# Patient Record
Sex: FEMALE | Race: WHITE | Employment: OTHER | URBAN - METROPOLITAN AREA
[De-identification: names, ages, dates, MRNs, and addresses within clinical notes are randomized per-mention and may not be internally consistent; named-entity substitution may affect disease eponyms.]

---

## 2017-09-19 ENCOUNTER — APPOINTMENT (OUTPATIENT)
Dept: INTERVENTIONAL RADIOLOGY/VASCULAR | Facility: HOSPITAL | Age: 82
DRG: 253 | End: 2017-09-19
Attending: SURGERY
Payer: COMMERCIAL

## 2017-09-19 ENCOUNTER — HOSPITAL ENCOUNTER (INPATIENT)
Facility: HOSPITAL | Age: 82
LOS: 4 days | Discharge: ANOTHER HOSPITAL | DRG: 253 | End: 2017-09-23
Attending: HOSPITALIST | Admitting: HOSPITALIST
Payer: COMMERCIAL

## 2017-09-19 PROBLEM — I70.234 ATHEROSCLEROSIS OF NATIVE ARTERY OF RIGHT LOWER EXTREMITY WITH ULCERATION OF MIDFOOT (HCC): Status: ACTIVE | Noted: 2017-09-19

## 2017-09-19 PROBLEM — I99.8 ISCHEMIC FOOT: Status: ACTIVE | Noted: 2017-09-19

## 2017-09-19 PROCEDURE — 99153 MOD SED SAME PHYS/QHP EA: CPT

## 2017-09-19 PROCEDURE — 80053 COMPREHEN METABOLIC PANEL: CPT | Performed by: HOSPITALIST

## 2017-09-19 PROCEDURE — 37211 THROMBOLYTIC ART THERAPY: CPT

## 2017-09-19 PROCEDURE — 85025 COMPLETE CBC W/AUTO DIFF WBC: CPT | Performed by: HOSPITALIST

## 2017-09-19 PROCEDURE — 85610 PROTHROMBIN TIME: CPT | Performed by: HOSPITALIST

## 2017-09-19 PROCEDURE — B41F1ZZ FLUOROSCOPY OF RIGHT LOWER EXTREMITY ARTERIES USING LOW OSMOLAR CONTRAST: ICD-10-PCS | Performed by: RADIOLOGY

## 2017-09-19 PROCEDURE — 36247 INS CATH ABD/L-EXT ART 3RD: CPT

## 2017-09-19 PROCEDURE — 86850 RBC ANTIBODY SCREEN: CPT | Performed by: RADIOLOGY

## 2017-09-19 PROCEDURE — 75710 ARTERY X-RAYS ARM/LEG: CPT

## 2017-09-19 PROCEDURE — 86900 BLOOD TYPING SEROLOGIC ABO: CPT | Performed by: RADIOLOGY

## 2017-09-19 PROCEDURE — 3E05317 INTRODUCTION OF OTHER THROMBOLYTIC INTO PERIPHERAL ARTERY, PERCUTANEOUS APPROACH: ICD-10-PCS | Performed by: RADIOLOGY

## 2017-09-19 PROCEDURE — 86901 BLOOD TYPING SEROLOGIC RH(D): CPT | Performed by: RADIOLOGY

## 2017-09-19 PROCEDURE — 99152 MOD SED SAME PHYS/QHP 5/>YRS: CPT

## 2017-09-19 PROCEDURE — 86920 COMPATIBILITY TEST SPIN: CPT

## 2017-09-19 RX ORDER — MORPHINE SULFATE 2 MG/ML
2 INJECTION, SOLUTION INTRAMUSCULAR; INTRAVENOUS EVERY 2 HOUR PRN
Status: DISCONTINUED | OUTPATIENT
Start: 2017-09-19 | End: 2017-09-23

## 2017-09-19 RX ORDER — ACETAMINOPHEN 160 MG
2000 TABLET,DISINTEGRATING ORAL DAILY
COMMUNITY

## 2017-09-19 RX ORDER — SODIUM CHLORIDE 0.9 % (FLUSH) 0.9 %
10 SYRINGE (ML) INJECTION AS NEEDED
Status: DISCONTINUED | OUTPATIENT
Start: 2017-09-19 | End: 2017-09-20 | Stop reason: HOSPADM

## 2017-09-19 RX ORDER — ACETAMINOPHEN 325 MG/1
650 TABLET ORAL EVERY 6 HOURS PRN
Status: DISCONTINUED | OUTPATIENT
Start: 2017-09-19 | End: 2017-09-20

## 2017-09-19 RX ORDER — HYDROCODONE BITARTRATE AND ACETAMINOPHEN 5; 325 MG/1; MG/1
1 TABLET ORAL EVERY 4 HOURS PRN
Status: DISCONTINUED | OUTPATIENT
Start: 2017-09-19 | End: 2017-09-21

## 2017-09-19 RX ORDER — SODIUM CHLORIDE 9 MG/ML
INJECTION, SOLUTION INTRAVENOUS CONTINUOUS
Status: DISCONTINUED | OUTPATIENT
Start: 2017-09-19 | End: 2017-09-20

## 2017-09-19 RX ORDER — SODIUM CHLORIDE 9 MG/ML
INJECTION, SOLUTION INTRAVENOUS
Status: COMPLETED
Start: 2017-09-19 | End: 2017-09-19

## 2017-09-19 RX ORDER — NITROGLYCERIN 20 MG/100ML
INJECTION INTRAVENOUS
Status: COMPLETED
Start: 2017-09-19 | End: 2017-09-19

## 2017-09-19 RX ORDER — SODIUM CHLORIDE 9 MG/ML
INJECTION, SOLUTION INTRAVENOUS
Status: DISPENSED
Start: 2017-09-19 | End: 2017-09-20

## 2017-09-19 RX ORDER — ONDANSETRON 2 MG/ML
4 INJECTION INTRAMUSCULAR; INTRAVENOUS EVERY 6 HOURS PRN
Status: DISCONTINUED | OUTPATIENT
Start: 2017-09-19 | End: 2017-09-20

## 2017-09-19 RX ORDER — MORPHINE SULFATE 2 MG/ML
2 INJECTION, SOLUTION INTRAMUSCULAR; INTRAVENOUS ONCE
Status: COMPLETED | OUTPATIENT
Start: 2017-09-19 | End: 2017-09-19

## 2017-09-19 RX ORDER — LIDOCAINE HYDROCHLORIDE 20 MG/ML
INJECTION, SOLUTION EPIDURAL; INFILTRATION; INTRACAUDAL; PERINEURAL
Status: COMPLETED
Start: 2017-09-19 | End: 2017-09-19

## 2017-09-19 RX ORDER — HEPARIN SODIUM 5000 [USP'U]/ML
5000 INJECTION, SOLUTION INTRAVENOUS; SUBCUTANEOUS EVERY 12 HOURS SCHEDULED
Status: DISCONTINUED | OUTPATIENT
Start: 2017-09-19 | End: 2017-09-20

## 2017-09-19 RX ORDER — SODIUM CHLORIDE 0.9 % (FLUSH) 0.9 %
3 SYRINGE (ML) INJECTION AS NEEDED
Status: DISCONTINUED | OUTPATIENT
Start: 2017-09-19 | End: 2017-09-20

## 2017-09-19 RX ORDER — MORPHINE SULFATE 2 MG/ML
INJECTION, SOLUTION INTRAMUSCULAR; INTRAVENOUS
Status: COMPLETED
Start: 2017-09-19 | End: 2017-09-19

## 2017-09-19 RX ORDER — MIDAZOLAM HYDROCHLORIDE 1 MG/ML
INJECTION INTRAMUSCULAR; INTRAVENOUS
Status: COMPLETED
Start: 2017-09-19 | End: 2017-09-19

## 2017-09-20 ENCOUNTER — ANESTHESIA (OUTPATIENT)
Dept: SURGERY | Facility: HOSPITAL | Age: 82
DRG: 253 | End: 2017-09-20
Payer: COMMERCIAL

## 2017-09-20 ENCOUNTER — ANESTHESIA EVENT (OUTPATIENT)
Dept: SURGERY | Facility: HOSPITAL | Age: 82
DRG: 253 | End: 2017-09-20
Payer: COMMERCIAL

## 2017-09-20 ENCOUNTER — APPOINTMENT (OUTPATIENT)
Dept: INTERVENTIONAL RADIOLOGY/VASCULAR | Facility: HOSPITAL | Age: 82
DRG: 253 | End: 2017-09-20
Attending: RADIOLOGY
Payer: COMMERCIAL

## 2017-09-20 ENCOUNTER — SURGERY (OUTPATIENT)
Age: 82
End: 2017-09-20

## 2017-09-20 PROCEDURE — 82607 VITAMIN B-12: CPT | Performed by: HOSPITALIST

## 2017-09-20 PROCEDURE — 36140 INTRO NDL ICATH UPR/LXTR ART: CPT

## 2017-09-20 PROCEDURE — 37214 CESSJ THERAPY CATH REMOVAL: CPT

## 2017-09-20 PROCEDURE — 80048 BASIC METABOLIC PNL TOTAL CA: CPT | Performed by: SURGERY

## 2017-09-20 PROCEDURE — B41F1ZZ FLUOROSCOPY OF RIGHT LOWER EXTREMITY ARTERIES USING LOW OSMOLAR CONTRAST: ICD-10-PCS | Performed by: RADIOLOGY

## 2017-09-20 PROCEDURE — 85384 FIBRINOGEN ACTIVITY: CPT | Performed by: RADIOLOGY

## 2017-09-20 PROCEDURE — 85610 PROTHROMBIN TIME: CPT | Performed by: RADIOLOGY

## 2017-09-20 PROCEDURE — 83010 ASSAY OF HAPTOGLOBIN QUANT: CPT | Performed by: HOSPITALIST

## 2017-09-20 PROCEDURE — 85027 COMPLETE CBC AUTOMATED: CPT | Performed by: HOSPITALIST

## 2017-09-20 PROCEDURE — 82746 ASSAY OF FOLIC ACID SERUM: CPT | Performed by: HOSPITALIST

## 2017-09-20 PROCEDURE — 85018 HEMOGLOBIN: CPT | Performed by: HOSPITALIST

## 2017-09-20 PROCEDURE — 06BP0ZZ EXCISION OF RIGHT SAPHENOUS VEIN, OPEN APPROACH: ICD-10-PCS | Performed by: SURGERY

## 2017-09-20 PROCEDURE — 84466 ASSAY OF TRANSFERRIN: CPT | Performed by: HOSPITALIST

## 2017-09-20 PROCEDURE — 85027 COMPLETE CBC AUTOMATED: CPT | Performed by: SURGERY

## 2017-09-20 PROCEDURE — 041M09Q BYPASS RIGHT POPLITEAL ARTERY TO LOWER EXTREMITY ARTERY WITH AUTOLOGOUS VENOUS TISSUE, OPEN APPROACH: ICD-10-PCS | Performed by: SURGERY

## 2017-09-20 PROCEDURE — 85730 THROMBOPLASTIN TIME PARTIAL: CPT | Performed by: RADIOLOGY

## 2017-09-20 PROCEDURE — 80048 BASIC METABOLIC PNL TOTAL CA: CPT | Performed by: HOSPITALIST

## 2017-09-20 PROCEDURE — 83540 ASSAY OF IRON: CPT | Performed by: HOSPITALIST

## 2017-09-20 RX ORDER — HYDROCODONE BITARTRATE AND ACETAMINOPHEN 5; 325 MG/1; MG/1
2 TABLET ORAL EVERY 6 HOURS PRN
Status: DISCONTINUED | OUTPATIENT
Start: 2017-09-20 | End: 2017-09-21

## 2017-09-20 RX ORDER — HYDROCODONE BITARTRATE AND ACETAMINOPHEN 5; 325 MG/1; MG/1
2 TABLET ORAL AS NEEDED
Status: COMPLETED | OUTPATIENT
Start: 2017-09-20 | End: 2017-09-20

## 2017-09-20 RX ORDER — SODIUM CHLORIDE 9 MG/ML
INJECTION, SOLUTION INTRAVENOUS
Status: COMPLETED
Start: 2017-09-20 | End: 2017-09-20

## 2017-09-20 RX ORDER — HEPARIN SODIUM 10000 [USP'U]/100ML
500 INJECTION, SOLUTION INTRAVENOUS CONTINUOUS
Status: DISCONTINUED | OUTPATIENT
Start: 2017-09-20 | End: 2017-09-21

## 2017-09-20 RX ORDER — MORPHINE SULFATE 4 MG/ML
4 INJECTION, SOLUTION INTRAMUSCULAR; INTRAVENOUS EVERY 10 MIN PRN
Status: DISCONTINUED | OUTPATIENT
Start: 2017-09-20 | End: 2017-09-21

## 2017-09-20 RX ORDER — HYDROMORPHONE HYDROCHLORIDE 1 MG/ML
0.4 INJECTION, SOLUTION INTRAMUSCULAR; INTRAVENOUS; SUBCUTANEOUS EVERY 5 MIN PRN
Status: DISCONTINUED | OUTPATIENT
Start: 2017-09-20 | End: 2017-09-21

## 2017-09-20 RX ORDER — MORPHINE SULFATE 10 MG/ML
6 INJECTION, SOLUTION INTRAMUSCULAR; INTRAVENOUS EVERY 10 MIN PRN
Status: DISCONTINUED | OUTPATIENT
Start: 2017-09-20 | End: 2017-09-21

## 2017-09-20 RX ORDER — SODIUM CHLORIDE, SODIUM LACTATE, POTASSIUM CHLORIDE, CALCIUM CHLORIDE 600; 310; 30; 20 MG/100ML; MG/100ML; MG/100ML; MG/100ML
INJECTION, SOLUTION INTRAVENOUS CONTINUOUS
Status: DISCONTINUED | OUTPATIENT
Start: 2017-09-20 | End: 2017-09-20

## 2017-09-20 RX ORDER — PHENYLEPHRINE HCL 10 MG/ML
VIAL (ML) INJECTION AS NEEDED
Status: DISCONTINUED | OUTPATIENT
Start: 2017-09-20 | End: 2017-09-20 | Stop reason: SURG

## 2017-09-20 RX ORDER — ROCURONIUM BROMIDE 10 MG/ML
INJECTION, SOLUTION INTRAVENOUS AS NEEDED
Status: DISCONTINUED | OUTPATIENT
Start: 2017-09-20 | End: 2017-09-20 | Stop reason: SURG

## 2017-09-20 RX ORDER — SODIUM CHLORIDE, SODIUM LACTATE, POTASSIUM CHLORIDE, CALCIUM CHLORIDE 600; 310; 30; 20 MG/100ML; MG/100ML; MG/100ML; MG/100ML
INJECTION, SOLUTION INTRAVENOUS CONTINUOUS PRN
Status: DISCONTINUED | OUTPATIENT
Start: 2017-09-20 | End: 2017-09-20 | Stop reason: SURG

## 2017-09-20 RX ORDER — HYDROMORPHONE HYDROCHLORIDE 1 MG/ML
0.8 INJECTION, SOLUTION INTRAMUSCULAR; INTRAVENOUS; SUBCUTANEOUS EVERY 2 HOUR PRN
Status: DISCONTINUED | OUTPATIENT
Start: 2017-09-20 | End: 2017-09-23

## 2017-09-20 RX ORDER — HYDROMORPHONE HYDROCHLORIDE 1 MG/ML
0.6 INJECTION, SOLUTION INTRAMUSCULAR; INTRAVENOUS; SUBCUTANEOUS EVERY 5 MIN PRN
Status: DISCONTINUED | OUTPATIENT
Start: 2017-09-20 | End: 2017-09-21

## 2017-09-20 RX ORDER — ATORVASTATIN CALCIUM 20 MG/1
20 TABLET, FILM COATED ORAL NIGHTLY
Status: DISCONTINUED | OUTPATIENT
Start: 2017-09-20 | End: 2017-09-23

## 2017-09-20 RX ORDER — MORPHINE SULFATE 2 MG/ML
2 INJECTION, SOLUTION INTRAMUSCULAR; INTRAVENOUS EVERY 10 MIN PRN
Status: DISCONTINUED | OUTPATIENT
Start: 2017-09-20 | End: 2017-09-21

## 2017-09-20 RX ORDER — HYDROCODONE BITARTRATE AND ACETAMINOPHEN 5; 325 MG/1; MG/1
1 TABLET ORAL AS NEEDED
Status: COMPLETED | OUTPATIENT
Start: 2017-09-20 | End: 2017-09-20

## 2017-09-20 RX ORDER — HYDROMORPHONE HYDROCHLORIDE 1 MG/ML
1.2 INJECTION, SOLUTION INTRAMUSCULAR; INTRAVENOUS; SUBCUTANEOUS EVERY 2 HOUR PRN
Status: DISCONTINUED | OUTPATIENT
Start: 2017-09-20 | End: 2017-09-23

## 2017-09-20 RX ORDER — DEXTROSE AND SODIUM CHLORIDE 5; .45 G/100ML; G/100ML
INJECTION, SOLUTION INTRAVENOUS CONTINUOUS
Status: DISCONTINUED | OUTPATIENT
Start: 2017-09-20 | End: 2017-09-23

## 2017-09-20 RX ORDER — ALPRAZOLAM 0.25 MG/1
0.25 TABLET ORAL 3 TIMES DAILY PRN
Status: DISCONTINUED | OUTPATIENT
Start: 2017-09-20 | End: 2017-09-23

## 2017-09-20 RX ORDER — FUROSEMIDE 20 MG/1
20 TABLET ORAL DAILY
Status: DISCONTINUED | OUTPATIENT
Start: 2017-09-21 | End: 2017-09-23

## 2017-09-20 RX ORDER — POTASSIUM CHLORIDE 20 MEQ/1
40 TABLET, EXTENDED RELEASE ORAL EVERY 4 HOURS
Status: COMPLETED | OUTPATIENT
Start: 2017-09-20 | End: 2017-09-20

## 2017-09-20 RX ORDER — ACETAMINOPHEN 325 MG/1
650 TABLET ORAL EVERY 6 HOURS PRN
Status: DISCONTINUED | OUTPATIENT
Start: 2017-09-20 | End: 2017-09-21

## 2017-09-20 RX ORDER — HEPARIN SODIUM 1000 [USP'U]/ML
INJECTION, SOLUTION INTRAVENOUS; SUBCUTANEOUS AS NEEDED
Status: DISCONTINUED | OUTPATIENT
Start: 2017-09-20 | End: 2017-09-20 | Stop reason: SURG

## 2017-09-20 RX ORDER — ALPRAZOLAM 0.25 MG/1
0.25 TABLET ORAL 3 TIMES DAILY PRN
Status: DISCONTINUED | OUTPATIENT
Start: 2017-09-20 | End: 2017-09-20

## 2017-09-20 RX ORDER — AMIODARONE HYDROCHLORIDE 200 MG/1
200 TABLET ORAL DAILY
Status: DISCONTINUED | OUTPATIENT
Start: 2017-09-20 | End: 2017-09-23

## 2017-09-20 RX ORDER — ONDANSETRON 2 MG/ML
4 INJECTION INTRAMUSCULAR; INTRAVENOUS EVERY 6 HOURS PRN
Status: DISCONTINUED | OUTPATIENT
Start: 2017-09-20 | End: 2017-09-23

## 2017-09-20 RX ORDER — LABETALOL HYDROCHLORIDE 5 MG/ML
1 INJECTION, SOLUTION INTRAVENOUS EVERY 5 MIN PRN
Status: DISCONTINUED | OUTPATIENT
Start: 2017-09-20 | End: 2017-09-23

## 2017-09-20 RX ORDER — LIDOCAINE HYDROCHLORIDE 10 MG/ML
INJECTION, SOLUTION EPIDURAL; INFILTRATION; INTRACAUDAL; PERINEURAL AS NEEDED
Status: DISCONTINUED | OUTPATIENT
Start: 2017-09-20 | End: 2017-09-20 | Stop reason: SURG

## 2017-09-20 RX ORDER — ONDANSETRON 2 MG/ML
INJECTION INTRAMUSCULAR; INTRAVENOUS AS NEEDED
Status: DISCONTINUED | OUTPATIENT
Start: 2017-09-20 | End: 2017-09-20 | Stop reason: SURG

## 2017-09-20 RX ORDER — HYDROMORPHONE HYDROCHLORIDE 1 MG/ML
0.4 INJECTION, SOLUTION INTRAMUSCULAR; INTRAVENOUS; SUBCUTANEOUS EVERY 2 HOUR PRN
Status: DISCONTINUED | OUTPATIENT
Start: 2017-09-20 | End: 2017-09-23

## 2017-09-20 RX ORDER — NALOXONE HYDROCHLORIDE 0.4 MG/ML
80 INJECTION, SOLUTION INTRAMUSCULAR; INTRAVENOUS; SUBCUTANEOUS AS NEEDED
Status: ACTIVE | OUTPATIENT
Start: 2017-09-20 | End: 2017-09-20

## 2017-09-20 RX ORDER — HALOPERIDOL 5 MG/ML
0.25 INJECTION INTRAMUSCULAR ONCE AS NEEDED
Status: ACTIVE | OUTPATIENT
Start: 2017-09-20 | End: 2017-09-20

## 2017-09-20 RX ORDER — HYDROMORPHONE HYDROCHLORIDE 1 MG/ML
0.2 INJECTION, SOLUTION INTRAMUSCULAR; INTRAVENOUS; SUBCUTANEOUS EVERY 5 MIN PRN
Status: DISCONTINUED | OUTPATIENT
Start: 2017-09-20 | End: 2017-09-21

## 2017-09-20 RX ORDER — HYDROCODONE BITARTRATE AND ACETAMINOPHEN 5; 325 MG/1; MG/1
1 TABLET ORAL EVERY 6 HOURS PRN
Status: DISCONTINUED | OUTPATIENT
Start: 2017-09-20 | End: 2017-09-21

## 2017-09-20 RX ORDER — ASPIRIN 325 MG
325 TABLET, DELAYED RELEASE (ENTERIC COATED) ORAL DAILY
Status: DISCONTINUED | OUTPATIENT
Start: 2017-09-20 | End: 2017-09-23

## 2017-09-20 RX ORDER — LIDOCAINE HYDROCHLORIDE 20 MG/ML
INJECTION, SOLUTION EPIDURAL; INFILTRATION; INTRACAUDAL; PERINEURAL
Status: COMPLETED
Start: 2017-09-20 | End: 2017-09-20

## 2017-09-20 RX ORDER — PROTAMINE SULFATE 10 MG/ML
INJECTION, SOLUTION INTRAVENOUS AS NEEDED
Status: DISCONTINUED | OUTPATIENT
Start: 2017-09-20 | End: 2017-09-20 | Stop reason: SURG

## 2017-09-20 RX ORDER — ATORVASTATIN CALCIUM 20 MG/1
20 TABLET, FILM COATED ORAL NIGHTLY
Status: DISCONTINUED | OUTPATIENT
Start: 2017-09-20 | End: 2017-09-20

## 2017-09-20 RX ORDER — NITROGLYCERIN 20 MG/100ML
INJECTION INTRAVENOUS
Status: COMPLETED
Start: 2017-09-20 | End: 2017-09-20

## 2017-09-20 RX ORDER — ONDANSETRON 2 MG/ML
4 INJECTION INTRAMUSCULAR; INTRAVENOUS ONCE AS NEEDED
Status: DISCONTINUED | OUTPATIENT
Start: 2017-09-20 | End: 2017-09-20

## 2017-09-20 RX ORDER — ASPIRIN 300 MG
300 SUPPOSITORY, RECTAL RECTAL DAILY
Status: DISCONTINUED | OUTPATIENT
Start: 2017-09-20 | End: 2017-09-20

## 2017-09-20 RX ADMIN — PHENYLEPHRINE HCL 100 MCG: 10 MG/ML VIAL (ML) INJECTION at 14:27:00

## 2017-09-20 RX ADMIN — ONDANSETRON 4 MG: 2 INJECTION INTRAMUSCULAR; INTRAVENOUS at 14:44:00

## 2017-09-20 RX ADMIN — LIDOCAINE HYDROCHLORIDE 30 MG: 10 INJECTION, SOLUTION EPIDURAL; INFILTRATION; INTRACAUDAL; PERINEURAL at 11:58:00

## 2017-09-20 RX ADMIN — PHENYLEPHRINE HCL 100 MCG: 10 MG/ML VIAL (ML) INJECTION at 12:50:00

## 2017-09-20 RX ADMIN — PHENYLEPHRINE HCL 100 MCG: 10 MG/ML VIAL (ML) INJECTION at 13:23:00

## 2017-09-20 RX ADMIN — PHENYLEPHRINE HCL 100 MCG: 10 MG/ML VIAL (ML) INJECTION at 14:39:00

## 2017-09-20 RX ADMIN — SODIUM CHLORIDE, SODIUM LACTATE, POTASSIUM CHLORIDE, CALCIUM CHLORIDE: 600; 310; 30; 20 INJECTION, SOLUTION INTRAVENOUS at 15:07:00

## 2017-09-20 RX ADMIN — SODIUM CHLORIDE: 9 INJECTION, SOLUTION INTRAVENOUS at 14:15:00

## 2017-09-20 RX ADMIN — PHENYLEPHRINE HCL 100 MCG: 10 MG/ML VIAL (ML) INJECTION at 12:13:00

## 2017-09-20 RX ADMIN — ROCURONIUM BROMIDE 30 MG: 10 INJECTION, SOLUTION INTRAVENOUS at 11:58:00

## 2017-09-20 RX ADMIN — HEPARIN SODIUM 4700 UNITS: 1000 INJECTION, SOLUTION INTRAVENOUS; SUBCUTANEOUS at 13:28:00

## 2017-09-20 RX ADMIN — SODIUM CHLORIDE, SODIUM LACTATE, POTASSIUM CHLORIDE, CALCIUM CHLORIDE: 600; 310; 30; 20 INJECTION, SOLUTION INTRAVENOUS at 11:50:00

## 2017-09-20 RX ADMIN — SODIUM CHLORIDE: 9 INJECTION, SOLUTION INTRAVENOUS at 12:07:00

## 2017-09-20 RX ADMIN — PROTAMINE SULFATE 40 MG: 10 INJECTION, SOLUTION INTRAVENOUS at 14:34:00

## 2017-09-20 RX ADMIN — PHENYLEPHRINE HCL 100 MCG: 10 MG/ML VIAL (ML) INJECTION at 14:32:00

## 2017-09-20 RX ADMIN — SODIUM CHLORIDE: 9 INJECTION, SOLUTION INTRAVENOUS at 14:10:00

## 2017-09-20 NOTE — PROGRESS NOTES
Post op note:  Arrived at [phone] from [facility] drowsy with htn and positive popliteal pulse via doppler; right post tib and pedal pulses absent/ htn being treated with labetolol orders and pain medication. intensivists following for further bp needs.  Left art li

## 2017-09-20 NOTE — CONSULTS
Critical Care Consult     Assessment / Plan:  1. PAD - planned for popliteal to posterior tibial bypass today  - okay to proceed from pulmonary perspective  2. Afib  - per cards   3. FEN  - NPO  4. PPx  - anticoagulation per vascular    Thanks.  Will follow 9/19/2017 at Unknown time   SANTYL 250 UNIT/GM External Ointment APPLY A THIN LAYER TO LEG WOUND AND REDNESS UTD Disp:  Rfl: 1    temazepam 15 MG Oral Cap TK 1 C PO NIGHTLY PRF UP TO 30 DAYS Disp:  Rfl: 0        Outpatient Prescriptions Marked as Taking fo bilaterally  Cardiovascular: RRR, no MRG  Abdo: soft, NTND  Ext: no edema  Skin: no rashes  Mental status: alert and interactive, answers questions appropriately    Labs:  Reviewed in EMR    Inpatient Medications:  Reviewed in EMR    Imaging:   No chest im

## 2017-09-20 NOTE — H&P
[facility] Hospitalist H&P       CC: No chief complaint on file.        PCP: Rafael Matta      ASSESSMENT / PLAN:     Ms. Keon Cervantes is an 81 yo F with PMH of Afib on eliquis, HTN, HL, PAD with critical limb ischemia who presents as direct admit from Dr. Ana Francis' non-healing ulcer, went to ER at [facility] on 9/15/17, pulseless on R foot, went for emergent cath, had angiogram and angioplasty of 2 vessels, did not have much improvement.  Had repeat cath on 9/19/17 with distal thrombus in peroneal artery per D Father    • Stroke Mother        Review of Systems  Comprehensive ROS reviewed and negative except for what's stated above.      OBJECTIVE:  /51 (BP Location: Right arm)   Pulse 83   Temp 98.6 °F (37 °C) (Oral)   Resp 22   Ht 180.3 cm (5' 11\")   Wt 1

## 2017-09-20 NOTE — PROGRESS NOTES
Rec'd patient approx 2045 from IR. Pt was very confused and in a high level of pain in the right thigh. Morphine was administered @ 2100 and knee immobilizers were placed on both legs as the pt was trying to get oob and bend her legs.  Isidro is in the lef

## 2017-09-20 NOTE — ANESTHESIA PREPROCEDURE EVALUATION
Anesthesia PreOp Note    HPI:     Ryan Robertson is a 80year old female who presents for preoperative consultation requested by: Blank Phillip MD    Date of Surgery: 9/19/2017 - 9/20/2017    Procedure(s):   FEMORAL POPLITEAL BYPASS  Indication: Debbyer Everton Oral Cap TK 1 C PO NIGHTLY PRF UP TO 30 DAYS Disp:  Rfl: 0        Current Facility-Administered Medications Ordered in Epic:  ALPRAZolam (XANAX) tab 0.25 mg 0.25 mg Oral TID PRN Margot Lucas MD 0.25 mg at 09/20/17 1047    amiodarone HCl (PACERONE) ta Sexual activity: Not on file     Other Topics Concern   None on file     Social History Narrative   None on file       Available pre-op labs reviewed.     Lab Results  Component Value Date   WBC 5.9 09/20/2017   RBC 2.95 (L) 09/20/2017   HGB 9.0 (L) 09/20/2

## 2017-09-20 NOTE — CONSULTS
VASCULAR SURGERY CONSULT NOTE        Name: Maya Diaz   :   10/15/1934  [id number]      REFERRING PHYSICIAN:  Ousmane Diop  PRIMARY CARE PHYSICIAN:  Rafael Matta     HISTORY OF PRESENT ILLNESS:   Patient is a 80year old female who has been r didn't communicate with her primary care physician, Dr. Angelica Mustafa in [place], ([phone]) who mentioned that the patient has been up-to-date on her screening studies.  She was admitted to the hospital where I recommended a short low dose thromboly gallop   ABDOMEN: soft, non-tender with no palpable aneurysm or masses  BACK: normal, no tenderness  SKIN: no rashes, warm and dry  EXTREMITIES: no tenderness  NEURO: no sensory or motor deficits  VASCULAR:        Femoral Popliteal DP PT Peroneal Edema   R attempt thrombolysis overnight and decide on the course of action after that.  Family and pt fully understand that this is a very tough situation and all the interventions may not work and thr outcome may still end up being an amputation.     PLAN:  Thrombo

## 2017-09-20 NOTE — DISCHARGE PLANNING
SHAHNAZ following up on d/c planning for the patient. SHAHNAZ met with the patient and her family at bedside. She lives with her  in a 2 story home with their bedroom on the first floor. She has a walker, cane and is independent with her care.   She has no

## 2017-09-20 NOTE — ANESTHESIA PROCEDURE NOTES
Arterial Line  Performed by: Mal Hitchcock  Authorized by: Mal Hitchcock     Procedure Start:  9/20/2017 11:55 AM  Procedure End:  9/20/2017 12:04 PM  Site Identification: surface landmarks    Patient Location:  OR  Indication: continuous blood pressure keanu

## 2017-09-20 NOTE — BRIEF OP NOTE
Patients Name: Arely Summers  Attending Physician: Ang Benavidez MD  Operating Physician: Mary Beth Trejo MD  CSN: [id number]     Location:  OR  MRN: [id number]    YOB: 1934  Admission Date: 9/19/2017  Operation Date: 9/20/2017    [facility]

## 2017-09-20 NOTE — PROGRESS NOTES
[facility]  Vascular Surgery Progress Note    Ryan Overall Patient Status:  Inpatient    10/15/1934 MRN [id number]   Location [facility] 2W/SW Attending Angus Durant MD   Hosp Day # 1 PCP Perlie Seip     Objective:   Temp: 98 fully understand that this is a high risk bypass graft with a high potential for failure. The patient may still have a patent bypass and still require a below-knee amputation and this was also discussed with them.       Medications:    Current Facility-Adm

## 2017-09-20 NOTE — BRIEF PROCEDURE NOTE
[facility] HOSP - [facility]  Procedure Note    Kyle Austinns Patient Status:  Inpatient    10/15/1934 MRN [id number]   Location [facility] Attending Harmeet Mendoza MD   Hosp Day # 0 PCP Tabby Negrete

## 2017-09-20 NOTE — ANESTHESIA POSTPROCEDURE EVALUATION
Patient: Xiang Celis    Procedure Summary     Date:  09/20/17 Room / Location:  [facility] OR  / [facility] OR    Anesthesia Start:  1150 Anesthesia Stop:      Procedure:  FEMORAL POPLITEAL BYPASS (Right ) Diagnosis:       Ischemic foot      (Ischemic fo

## 2017-09-20 NOTE — ANESTHESIA PREPROCEDURE EVALUATION
Anesthesia PreOp Note    HPI:     Ryan Robertson is a 80year old female who presents for preoperative consultation requested by: Blank Phillip MD    Date of Surgery: 9/19/2017 - 9/20/2017    Procedure(s):   FEMORAL POPLITEAL BYPASS  Indication: Debbyer Everton Oral Cap TK 1 C PO NIGHTLY PRF UP TO 30 DAYS Disp:  Rfl: 0        Current Facility-Administered Medications Ordered in Epic:  ALPRAZolam (XANAX) tab 0.25 mg 0.25 mg Oral TID PRN Nilesh Fay MD 0.25 mg at 09/20/17 1047    amiodarone HCl (PACERONE) ta Sexual activity: Not on file     Other Topics Concern   None on file     Social History Narrative   None on file       Available pre-op labs reviewed.     Lab Results  Component Value Date   WBC 5.9 09/20/2017   RBC 2.95 (L) 09/20/2017   HGB 9.0 (L) 09/20/2 Monitors and Lines:   A-line, Additonal IV and BIS  Airway:  ETT  Informed Consent Plan and Risks Discussed With:  Patient and spouse  Discussed plan with:  CRNA, attending and surgeon  Provider Attestation (if preop done by other):  Erin Rinne

## 2017-09-20 NOTE — PAYOR COMM NOTE
--------------  ADMISSION REVIEW     JesseStevenson Acosta ANIYAH  Subscriber #:  [id number]  Authorization Number: N/A    Admit date: 9/19/17  Admit time: 1637       Admitting Physician: Ev Escamilla MD  Attending Physician:  Ev Escamilla MD  Primary Care continue to follow patient while in house    Patient and/or patient's family given opportunity to ask questions and note understanding and agreeing with therapeutic plan as outlined    Emily Queen MD  [facility] Hospitalist  Answering Service number: [phone] DAILY. Disp:  Rfl: 4   ELIQUIS 5 MG Oral Tab TK 1 T PO BID Disp:  Rfl: 9   atorvastatin 20 MG Oral Tab TK 1 T PO QD Disp:  Rfl: 0   furosemide 20 MG Oral Tab Take 20 mg by mouth daily.    Disp:  Rfl: 2   HYDROcodone-acetaminophen 5-325 MG Oral Tab TK 1 OR 2 16 09/19/2017    09/19/2017   K 4.1 09/19/2017   CL 99 09/19/2017   CO2 27 09/19/2017    09/19/2017   CA 8.9 09/19/2017   ALB 3.4 09/19/2017   ALKPHO 69 09/19/2017   BILT 0.9 09/19/2017   TP 6.1 09/19/2017   AST 34 09/19/2017   ALT 27 09/19/20 ringers infusion     Date Action Dose Route User    9/20/2017 1150 New Bag (none) Intravenous Andrew Chance CRNA      Lidocaine HCl (PF) (XYLOCAINE) 1 % injection SOLN     Date Action Dose Route User    9/20/2017 1158 Given 30 mg Intravenous Betti Schilder, Intravenous Eloise Schultz CRNA    9/20/2017 1213 Given 100 mcg Intravenous Eloise Schultz CRNA      Potassium Chloride ER (K-DUR M20) CR tab 40 mEq     Date Action Dose Route User    9/20/2017 1048 Given 40 mEq Oral Taina Gilmore RN    9/20/ not a candidate for further endovascular intervention.   Low-dose thrombolysis planned, with repeat angiography in a.m., with the intent of identifying any potential target vessels for distal surgical bypass.     Tramaine Clay  9/19/2017 9/20  Hosp Day # answered and they've expressed a wish to proceed. They fully understand that this is a high risk bypass graft with a high potential for failure.   The patient may still have a patent bypass and still require a below-knee amputation and this was also discus planned for popliteal to posterior tibial bypass today  - okay to proceed from pulmonary perspective  2. Afib  - per cards   3. FEN  - NPO  4. PPx  - anticoagulation per vascular     Thanks.  Will follow     Frederick Tinoco MD  Pulmonary and Critical Care

## 2017-09-20 NOTE — PROGRESS NOTES
Pt has been confused/forgetful throughout the night since arrival from cath lab. q15min rounds has been done throughout by staff for safety of the patient and equipment as she has frequently been removing her tele/pulse-ox/oxygen.     Approx 0320, noise was

## 2017-09-20 NOTE — PROGRESS NOTES
[facility] Hospitalist Progress Note     CC: Hospital Follow up    PCP: Hugo Sands       Assessment/Plan:     Active Problems:    Ischemic foot    Ms. Telly Hameed is an 81 yo F with PMH of Afib on eliquis, HTN, HL, PAD with critical limb ischemia who presents as OBJECTIVE:    Blood pressure 119/58, pulse 75, temperature 98 °F (36.7 °C), temperature source Temporal, resp. rate 15, height 180.3 cm (5' 11\"), weight 147 lb 11.2 oz (67 kg), SpO2 99 %.     Temp:  [98 °F (36.7 °C)-99.4 °F (37.4 °C)] 98 °F (36.7 °C) [MAR Hold] ALPRAZolam, HYDROcodone-acetaminophen **OR** HYDROcodone-acetaminophen, fentaNYL citrate, fentaNYL citrate, HYDROmorphone HCl PF, HYDROmorphone HCl PF, HYDROmorphone HCl PF, morphINE sulfate, morphINE sulfate, morphINE sulfate (PF), Atropine

## 2017-09-21 PROCEDURE — 85027 COMPLETE CBC AUTOMATED: CPT | Performed by: HOSPITALIST

## 2017-09-21 PROCEDURE — 97110 THERAPEUTIC EXERCISES: CPT

## 2017-09-21 PROCEDURE — 80048 BASIC METABOLIC PNL TOTAL CA: CPT | Performed by: HOSPITALIST

## 2017-09-21 PROCEDURE — 85730 THROMBOPLASTIN TIME PARTIAL: CPT | Performed by: SURGERY

## 2017-09-21 PROCEDURE — 82248 BILIRUBIN DIRECT: CPT | Performed by: HOSPITALIST

## 2017-09-21 PROCEDURE — 97166 OT EVAL MOD COMPLEX 45 MIN: CPT

## 2017-09-21 RX ORDER — HYDROCODONE BITARTRATE AND ACETAMINOPHEN 5; 325 MG/1; MG/1
1 TABLET ORAL EVERY 4 HOURS PRN
Status: DISCONTINUED | OUTPATIENT
Start: 2017-09-21 | End: 2017-09-23

## 2017-09-21 RX ORDER — 0.9 % SODIUM CHLORIDE 0.9 %
VIAL (ML) INJECTION
Status: DISPENSED
Start: 2017-09-21 | End: 2017-09-22

## 2017-09-21 RX ORDER — ACETAMINOPHEN 325 MG/1
650 TABLET ORAL EVERY 6 HOURS PRN
Status: DISCONTINUED | OUTPATIENT
Start: 2017-09-21 | End: 2017-09-23

## 2017-09-21 RX ORDER — HYDROCODONE BITARTRATE AND ACETAMINOPHEN 5; 325 MG/1; MG/1
2 TABLET ORAL EVERY 4 HOURS PRN
Status: DISCONTINUED | OUTPATIENT
Start: 2017-09-21 | End: 2017-09-23

## 2017-09-21 RX ORDER — MELATONIN
325 2 TIMES DAILY WITH MEALS
Status: DISCONTINUED | OUTPATIENT
Start: 2017-09-21 | End: 2017-09-23

## 2017-09-21 NOTE — PLAN OF CARE
MUSCULOSKELETAL - ADULT    • Return mobility to safest level of function Not Progressing    • Maintain proper alignment of affected body part Not Progressing        Pt on bedrest, w/ jignesh heels of bed, pt/ot to see pt this am  HEMATOLOGIC - ADULT    • Free

## 2017-09-21 NOTE — DIETARY NOTE
ADULT NUTRITION INITIAL ASSESSMENT    Pt is at moderate nutrition risk. Pt meets malnutrition criteria. RECOMMENDATIONS TO MD:   RD initiated Oral Nutritional Supplements (ONS) to maximize po.      CRITERIA FOR MALNUTRITION DIAGNOSIS:  Criteria for no 9/19)---wt gain 7# (4.5%) likely due to fluids, on lasix, and post op. BMI: Body mass index is 21.48 kg/m².          BMI Classification: 19-24.9 kg/m2 - WNL  IBW: 155 #  --adjusted to 146# adj IBW d/t plan Right BKA     100% IBW       Usual Body Wt: 150-1 Monitor: wt and wt change  - Nutrition Goals: allow wt loss due to fluid losses, true wt gain, PO greater than 75% of meals, labs WNL and support wound healing    DIETITIAN FOLLOW UP:  RD to follow up within 7 days.    Omero Davies RD, LDN, [facility]  Clinical D

## 2017-09-21 NOTE — PROGRESS NOTES
[facility] Cardiology/[facility]  Progress Note    Kp Olp Patient Status:  Inpatient    10/15/1934 MRN [id number]   Location [facility] 2W/SW Attending Gina Muniz MD   Hosp Day # 2 PCP [facility]     ASSESSMENT:    1. oriented, normal affect. Skin: Warm and dry.    Psych: Appropriate  : [place]      Laboratory/Data:  Diagnostics:     TELE: NSR    Labs:    Lab Results  Component Value Date   WBC 6.7 09/21/2017   HGB 7.6 09/21/2017   HCT 22.9 09/21/2017   MCV 90.7 09/21/2 mg Intravenous Q2H PRN   heparin infusion in D5W IV premix 52878hpxul/250ml 500 Units/hr Intravenous Continuous   Labetalol HCl (TRANDATE) injection 1 mg 1 mg Intravenous Q5 Min PRN   atorvastatin (LIPITOR) tab 20 mg 20 mg Oral Nightly   ALPRAZolam Phillip Flowers)

## 2017-09-21 NOTE — PROGRESS NOTES
[facility] Hospitalist Progress Note     CC: Hospital Follow up    PCP: Rafael Matta       Assessment/Plan:     Active Problems:    Ischemic foot    Ms. Keon Cervantes is an 81 yo F with PMH of Afib on eliquis, HTN, HL, PAD with critical limb ischemia who presents as therapeutic plan as outlined     Iva Epstein MD  [facility] Hospitalist  Answering Service number: [phone]     Subjective:     No confusion overnight, having some pain in R foot when toes are touching end of bed, no CP or SOB.      OBJECTIVE:    Blood p CL  101  108  106   CO2  25  24  25       Recent Labs   Lab  09/19/17   1707   ALT  27   AST  34   ALB  3.4*         Imaging:          Meds:     • ferrous sulfate  325 mg Oral BID with meals   • amiodarone HCl  200 mg Oral Daily   • furosemide  20 mg Ora

## 2017-09-21 NOTE — PROGRESS NOTES
[facility]  Vascular Surgery Progress Note    Chase Quinones Patient Status:  Inpatient    10/15/1934 MRN [id number]   Location [facility] 2W/SW Attending Nohemy Mcdonough MD   Hosp Day # 2 PCP Willard Fam     Objective:   Temp: 98 mcg 50 mcg Intravenous Q5 Min PRN   HYDROmorphone HCl PF (DILAUDID) 1 MG/ML injection 0.2 mg 0.2 mg Intravenous Q5 Min PRN   HYDROmorphone HCl PF (DILAUDID) 1 MG/ML injection 0.4 mg 0.4 mg Intravenous Q5 Min PRN   HYDROmorphone HCl PF (DILAUDID) 1 MG/ML in 8. 5*  7.6*   MCV  90.9   --    --   89.5   --   90.5  90.7   PLT  128*   --    --   102*   --   102*  107*   INR  1.1  1.1  1.1   --    --    --    --        Recent Labs   Lab  09/19/17   1707  09/20/17   0415  09/20/17   1652  09/21/17   0419   NA  133*

## 2017-09-21 NOTE — PAYOR COMM NOTE
--------------  CONTINUED STAY REVIEW    [facility]  Subscriber #:  [id number]  Authorization Number: [id number]    Admit date: 9/19/17  Admit time: [address]38 B [facility]    Admitting Physician: Jesenia Lew MD  Attending Physician:  Jesenia Lew MD  [facility] 9/20/2017 2156 Given 1 tablet Oral Tamica MERAZ RN      HYDROcodone-acetaminophen (NORCO) 5-325 MG per tab 1 tablet     Date Action Dose Route User    9/20/2017 2200 Given 1 tablet Oral Xi Hensley, RN      HYDROcodone-acetaminophen [facility]) 5 (RAIZA-SYNEPHRINE) 10 MG/ML injection     Date Action Dose Route User    9/20/2017 1439 Given 100 mcg Intravenous Aldo Curly, CRNA    9/20/2017 1432 Given 100 mcg Intravenous Aldo Curly, CRNA    9/20/2017 1427 Given 100 mcg Intravenous [place] patient later on and make a decision.   I did reemphasize to the  that I feel that she will likely require an amputation but elected to hold off until the situation is more clear.     Medications:     Current Facility-Administered Medications:  елена atorvastatin (LIPITOR) tab 20 mg 20 mg Oral Nightly   ALPRAZolam (XANAX) tab 0.25 mg 0.25 mg Oral TID PRN   HYDROcodone-acetaminophen (NORCO) 5-325 MG per tab 1 tablet 1 tablet Oral Q4H PRN   morphINE sulfate (PF) 2 MG/ML injection 2 mg 2 mg Intravenous monitor mental status closely given delirium overnight     Delirium - improved  - family denies hx of dementia; had confusion overnight, possibly due to narcotics, has been taking Norco Q4hr at home, will minimize use of IV morphine  - frequent reorientati kg)  09/19/17 1702 : 147 lb 11.2 oz (67 kg)        Exam  GEN: Elderly female, NAD, calm, Awake, oriented x 2  HEENT: EOMI, PERRLA  Neck: Supple, no JVD  Pulm: CTAB, no crackles or wheezes  CV: RRR, no murmurs, no pulses R DP/PT  ABD: Soft, non-tender, non- HCl, ALPRAZolam, HYDROcodone-acetaminophen, morphINE sulfate

## 2017-09-21 NOTE — PROGRESS NOTES
Critical Care Progress Note     Assessment / Plan:  1. PAD s/p right popliteal to posterior tibial artery bypass  - pain control  - per vascular  2. 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 Delirium  - supportive care  5. FEN  - ADA diet  6.

## 2017-09-21 NOTE — PROGRESS NOTES
The patient's graft has failed as expected and her pain in her foot has come back. .  I had a prolonged discussion with her and her family. I recommended a right below knee amputation as I had originally thought she may need.   The patient was given the opt

## 2017-09-21 NOTE — PHYSICAL THERAPY NOTE
PT eval orders received, Attempted to see patient for PT but Per RN Doug Castillo, doctor doesn't want too much upright activity at this time. Per Doug Castillo, please hold PT until tomorrow and until MD clears out of bed activity and amb.  Will f/u tomorrow as pe

## 2017-09-22 ENCOUNTER — ANESTHESIA (OUTPATIENT)
Dept: SURGERY | Facility: HOSPITAL | Age: 82
End: 2017-09-22

## 2017-09-22 ENCOUNTER — SURGERY (OUTPATIENT)
Age: 82
End: 2017-09-22

## 2017-09-22 ENCOUNTER — ANESTHESIA EVENT (OUTPATIENT)
Dept: SURGERY | Facility: HOSPITAL | Age: 82
End: 2017-09-22

## 2017-09-22 PROCEDURE — 30233R1 TRANSFUSION OF NONAUTOLOGOUS PLATELETS INTO PERIPHERAL VEIN, PERCUTANEOUS APPROACH: ICD-10-PCS | Performed by: HOSPITALIST

## 2017-09-22 PROCEDURE — 85018 HEMOGLOBIN: CPT | Performed by: HOSPITALIST

## 2017-09-22 PROCEDURE — 85027 COMPLETE CBC AUTOMATED: CPT | Performed by: HOSPITALIST

## 2017-09-22 PROCEDURE — 36430 TRANSFUSION BLD/BLD COMPNT: CPT

## 2017-09-22 RX ORDER — MORPHINE SULFATE 2 MG/ML
2 INJECTION, SOLUTION INTRAMUSCULAR; INTRAVENOUS EVERY 10 MIN PRN
Status: DISCONTINUED | OUTPATIENT
Start: 2017-09-22 | End: 2017-09-23

## 2017-09-22 RX ORDER — HYDROMORPHONE HYDROCHLORIDE 1 MG/ML
0.4 INJECTION, SOLUTION INTRAMUSCULAR; INTRAVENOUS; SUBCUTANEOUS EVERY 5 MIN PRN
Status: DISCONTINUED | OUTPATIENT
Start: 2017-09-22 | End: 2017-09-23

## 2017-09-22 RX ORDER — HYDROCODONE BITARTRATE AND ACETAMINOPHEN 5; 325 MG/1; MG/1
2 TABLET ORAL AS NEEDED
Status: COMPLETED | OUTPATIENT
Start: 2017-09-22 | End: 2017-09-22

## 2017-09-22 RX ORDER — HYDROMORPHONE HYDROCHLORIDE 1 MG/ML
0.6 INJECTION, SOLUTION INTRAMUSCULAR; INTRAVENOUS; SUBCUTANEOUS EVERY 5 MIN PRN
Status: DISCONTINUED | OUTPATIENT
Start: 2017-09-22 | End: 2017-09-23

## 2017-09-22 RX ORDER — HALOPERIDOL 5 MG/ML
0.25 INJECTION INTRAMUSCULAR ONCE AS NEEDED
Status: ACTIVE | OUTPATIENT
Start: 2017-09-22 | End: 2017-09-22

## 2017-09-22 RX ORDER — SODIUM CHLORIDE 9 MG/ML
INJECTION, SOLUTION INTRAVENOUS
Status: DISPENSED
Start: 2017-09-22 | End: 2017-09-22

## 2017-09-22 RX ORDER — MORPHINE SULFATE 4 MG/ML
4 INJECTION, SOLUTION INTRAMUSCULAR; INTRAVENOUS EVERY 10 MIN PRN
Status: DISCONTINUED | OUTPATIENT
Start: 2017-09-22 | End: 2017-09-23

## 2017-09-22 RX ORDER — SODIUM CHLORIDE, SODIUM LACTATE, POTASSIUM CHLORIDE, CALCIUM CHLORIDE 600; 310; 30; 20 MG/100ML; MG/100ML; MG/100ML; MG/100ML
INJECTION, SOLUTION INTRAVENOUS CONTINUOUS
Status: DISCONTINUED | OUTPATIENT
Start: 2017-09-22 | End: 2017-09-23

## 2017-09-22 RX ORDER — ONDANSETRON 2 MG/ML
4 INJECTION INTRAMUSCULAR; INTRAVENOUS ONCE AS NEEDED
Status: ACTIVE | OUTPATIENT
Start: 2017-09-22 | End: 2017-09-22

## 2017-09-22 RX ORDER — MORPHINE SULFATE 10 MG/ML
6 INJECTION, SOLUTION INTRAMUSCULAR; INTRAVENOUS EVERY 10 MIN PRN
Status: DISCONTINUED | OUTPATIENT
Start: 2017-09-22 | End: 2017-09-23

## 2017-09-22 RX ORDER — HYDROCODONE BITARTRATE AND ACETAMINOPHEN 5; 325 MG/1; MG/1
1 TABLET ORAL AS NEEDED
Status: COMPLETED | OUTPATIENT
Start: 2017-09-22 | End: 2017-09-22

## 2017-09-22 RX ORDER — HYDROMORPHONE HYDROCHLORIDE 1 MG/ML
0.2 INJECTION, SOLUTION INTRAMUSCULAR; INTRAVENOUS; SUBCUTANEOUS EVERY 5 MIN PRN
Status: DISCONTINUED | OUTPATIENT
Start: 2017-09-22 | End: 2017-09-23

## 2017-09-22 RX ORDER — SODIUM CHLORIDE 9 MG/ML
INJECTION, SOLUTION INTRAVENOUS ONCE
Status: DISCONTINUED | OUTPATIENT
Start: 2017-09-22 | End: 2017-09-23

## 2017-09-22 RX ORDER — NALOXONE HYDROCHLORIDE 0.4 MG/ML
80 INJECTION, SOLUTION INTRAMUSCULAR; INTRAVENOUS; SUBCUTANEOUS AS NEEDED
Status: ACTIVE | OUTPATIENT
Start: 2017-09-22 | End: 2017-09-22

## 2017-09-22 RX ORDER — SODIUM CHLORIDE 0.9 % (FLUSH) 0.9 %
10 SYRINGE (ML) INJECTION AS NEEDED
Status: DISCONTINUED | OUTPATIENT
Start: 2017-09-22 | End: 2017-09-23

## 2017-09-22 NOTE — PROGRESS NOTES
[facility] Cardiology/[facility]  Progress Note    Domenico Dozier Patient Status:  Inpatient    10/15/1934 MRN [id number]   Location [facility] 2W/SW Attending Cory Bill MD   Hosp Day # 3 PCP [facility]     ASSESSMENT:    1. non-tender. Extremities: R foot mottled cool   Neurologic: Alert and oriented, normal affect. Skin: Warm and dry.    Psych: Appropriate  : [place]      Laboratory/Data:  Diagnostics:     TELE: NSR    Labs:    Lab Results  Component Value Date   WBC 5.3 0

## 2017-09-22 NOTE — CM/SW NOTE
CONRADO met with patient and  at bedside regarding pending surgery. Patient is an 80year old  female from [place]. DX:  S/P Right Lower Extremity ischemia s/p bypass 9/2/17 which failed.   She is scheduled for a Right BKA today -  Tentatively

## 2017-09-22 NOTE — PLAN OF CARE
- Pt will require an amputation.  - Will have procedure here at [address] [place] [address]  - Heparin d/c per Dr. Soni Schmitt.  - Surgery most likely on Friday.            MUSCULOSKELETAL - ADULT    • Return mobility to safest level of function Not Progressing    • Maintain proper ali

## 2017-09-22 NOTE — OCCUPATIONAL THERAPY NOTE
Patient scheduled to have R BKA at 3pm this afternoon. Patient will be placed on hold and require new order when medically appropriate. Thank you.     Bruce E Shailesh Frias   Occupational Therapist  [address]

## 2017-09-22 NOTE — ANESTHESIA PREPROCEDURE EVALUATION
Anesthesia PreOp Note    HPI:     Christi Fields is a 80year old female who presents for preoperative consultation requested by: Tal Duarte MD    Date of Surgery: 9/19/2017 - 9/20/2017    Procedure(s):   FEMORAL POPLITEAL BYPASS  Indication: Kadie Titus 1107    dextrose 5 %-0.45 % NaCl infusion  Intravenous Continuous Najjar, Samer F, MD Last Rate: 100 mL/hr at 09/22/17 0222 100 mL/hr at 09/22/17 0222   aspirin EC EC tab 325 mg 325 mg Oral Daily Najjar, Samer F,  mg at 09/22/17 0811    ondansetron H (L) 09/22/2017   HCT 22.7 (L) 09/22/2017   MCV 90.9 09/22/2017   MCH 29.5 09/22/2017   MCHC 32.5 09/22/2017   RDW 13.8 09/22/2017    (L) 09/22/2017   MPV 10.3 09/22/2017       Lab Results  Component Value Date    (L) 09/21/2017   K 3.9 09/21/2 the best of my ability. The patient desires the anesthetic management as planned. I have informed Shen Garcia  And  of the risks of arterial lines including, but not limited to: failure, infection, bleeding, nerve damage, and vascular occlusion.

## 2017-09-22 NOTE — PROGRESS NOTES
[facility] Hospitalist Progress Note     CC: Hospital Follow up    PCP: Juan Sims       Assessment/Plan:     Active Problems:    Ischemic foot    Ms. Adithya Artis is an 81 yo F with PMH of Afib on eliquis, HTN, HL, PAD with critical limb ischemia who presents as recommendations pending patient's clinical course.   [facility] hospitalist to continue to follow patient while in house     Patient and/or patient's family given opportunity to ask questions and note understanding and agreeing with therapeutic plan as outlined    142*   BUN  16  12  10   CREATSERUM  0.69  0.65  0.63   GFRAA  >60  >60  >60   GFRNAA  >60  >60  >60   CA  8.3*  8.0*  8.2*   NA  132*  135*  134*   K  3.4  4.3  3.9   CL  101  108  106   CO2  25  24  25       Recent Labs   Lab  09/19/17   1707   ALT  27

## 2017-09-22 NOTE — CM/SW NOTE
CTL update regarding progression of care and discharge plan. CTL received a call from Asmita Walker stating that Mrs. Abram Yao cancelled her surgery.   The  spoke to a physician in [place] who may be able to do an emergent surgery to her

## 2017-09-22 NOTE — PROGRESS NOTES
Critical Care Progress Note     Assessment / Plan:  1. PAD s/p right popliteal to posterior tibial artery bypass on 9/20  - now planned for BKA  - per vascular  2. 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  Delirium  - supportive care

## 2017-09-22 NOTE — PROGRESS NOTES
Summary  Patient is a 80year old female with a history of atrial fibrillation, HTN, and hyperlipidemia who has been referred by her cardiologist, Dr. Humaira Vidal [facility] in [place], [place]) regarding severe ischemia of her right foot.   The hi September 18 and a distal thrombus was noted in the distal peroneal artery that was seen 3 days ago. The patient was discharged from the hospital immediately to my office. The patient has lost significant amount of weight and appeared ill.  The patient was and the following day,  the films revealed that the proximal peroneal artery that was noted to have an occlusion on the initial films and thrombolytic infusion of TPA has resulted in lysis of thrombus and restoration of patency to the proximal peroneal, wi to summarize her care so far to help guide the next step. I have provided them with the copies of the films that they requested.

## 2017-09-22 NOTE — OCCUPATIONAL THERAPY NOTE
OCCUPATIONAL THERAPY EVALUATION - INPATIENT      Room Number: 230/230-A  Evaluation Date: 9/21/2017  Type of Evaluation: Initial  Presenting Problem:  (RLE popliteal to posterior tibial bypass)    Physician Order: IP Consult to Occupational Therapy  Reason replacement  No date: OTHER      Comment: wrist orif  No date: TOTAL HIP REPLACEMENT Bilateral  No date: TOTAL KNEE REPLACEMENT Bilateral    HOME SITUATION  Type of Home: House     Lives With: Spouse    Toilet and Equipment: Standard height toilet    Drive Mod  Dynamic Standing:  Mod x2    FUNCTIONAL ADL ASSESSMENT  Grooming: Min  Feeding: Independent  Bathing: Not completed  Toileting: Not completed  Upper Body Dressing: Not completed   Lower Body Dressing: Max      Education Provided: Role of OT, Therapeuti

## 2017-09-22 NOTE — PLAN OF CARE
MUSCULOSKELETAL - ADULT    • Return mobility to safest level of function Not Progressing    • Maintain proper alignment of affected body part Not Progressing        Patient/Family Goals    • Patient/Family Long Term Goal Not Progressing    • Patient/Family

## 2017-09-23 VITALS
SYSTOLIC BLOOD PRESSURE: 137 MMHG | OXYGEN SATURATION: 92 % | RESPIRATION RATE: 14 BRPM | TEMPERATURE: 99 F | WEIGHT: 154.81 LBS | BODY MASS INDEX: 21.67 KG/M2 | HEART RATE: 72 BPM | HEIGHT: 71 IN | DIASTOLIC BLOOD PRESSURE: 56 MMHG

## 2017-09-23 PROCEDURE — 80048 BASIC METABOLIC PNL TOTAL CA: CPT | Performed by: HOSPITALIST

## 2017-09-23 PROCEDURE — 86900 BLOOD TYPING SEROLOGIC ABO: CPT | Performed by: HOSPITALIST

## 2017-09-23 PROCEDURE — 85025 COMPLETE CBC W/AUTO DIFF WBC: CPT | Performed by: HOSPITALIST

## 2017-09-23 PROCEDURE — 86901 BLOOD TYPING SEROLOGIC RH(D): CPT | Performed by: HOSPITALIST

## 2017-09-23 PROCEDURE — 86850 RBC ANTIBODY SCREEN: CPT | Performed by: HOSPITALIST

## 2017-09-23 RX ORDER — MELATONIN
325 2 TIMES DAILY WITH MEALS
Qty: 60 TABLET | Refills: 0 | Status: SHIPPED | OUTPATIENT
Start: 2017-09-23

## 2017-09-23 NOTE — PROGRESS NOTES
[facility] ambulance at bedside to transport patient to [facility] for transport to [facility] in [address]

## 2017-09-23 NOTE — DISCHARGE SUMMARY
General Medicine Discharge Summary     Patient ID:  Brooke Kay  80year old  10/15/1934    Admit date: 9/19/2017    Discharge date and time: 09/23/17    Attending Physician: Carlitos Campos MD     Primary Care Physician: Hu Casas recommended for BKA on 9/22/17, patient/family refusing, would like second opinion; plan for transfer to OSH  - continue norco 5 Q4hr PRN with IV morphine for breakthrough pain, monitor mental status closely given history of delirium in her post op course CONTINUE taking these medications    ALPRAZolam 0.25 MG Tabs  Commonly known as:  XANAX     amiodarone HCl 200 MG Tabs  Commonly known as:  PACERONE     atorvastatin 20 MG Tabs  Commonly known as:  LIPITOR     furosemide 20 MG Tabs  Commonly known as:  L

## 2017-09-23 NOTE — PLAN OF CARE
MUSCULOSKELETAL - ADULT    • Return mobility to safest level of function Not Progressing        Patient/Family Goals    • Patient/Family Long Term Goal Not Progressing          HEMATOLOGIC - ADULT    • Free from bleeding injury Progressing        MUSCULOSK

## 2017-09-23 NOTE — PROGRESS NOTES
Pulmonary Progress Note     Assessment / Plan:  1. PAD s/p right popliteal to posterior tibial artery bypass on 9/20  - per vascular surgery  2. Afib  - amio  - lasix  - statin  - cards following  3. 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  Delirium - improved  -

## 2017-10-02 NOTE — PAYOR COMM NOTE
PLEASE FAX BACK CONFIRMATION OF DAYS APPROVED  REQUESTING 4 DAYS     9/21  Events Yesterday/Overnight:  Stable overnight. On a low-dose heparin drip.   The patient repeated reports less foot pain and minor incisional pain.     Exam:  The foot is warmer cuco PRN   morphINE sulfate (PF) 10 MG/ML injection 6 mg 6 mg Intravenous Q10 Min PRN   dextrose 5 %-0.45 % NaCl infusion   Intravenous Continuous   aspirin EC EC tab 325 mg 325 mg Oral Daily   ondansetron HCl (ZOFRAN) injection 4 mg 4 mg Intravenous Q6H PRN 8. 3*  8.0*  8.2*   GLU  126*  112*  118*  142*             Recent Labs   Lab  09/19/17   1707   ALT  27   AST  34   ALB  3.4*         No results for input(s): TROP in the last 72 hours.     Snow Bautista MD  9/21/2017 9/21  The patient's graft has rama Hollie Rod #:  [id number]  Authorization Number: [id number]    Admit date: 9/19/17  Admit time:  7565  Discharge Date: 9/23/2017 10:20 AM     Admitting Physician: Deette Sacks, MD  Attending Physician:  No att. providers found  Primary Care significant R foot pain, no CP, SOB. Had coronary angiogram on 8/28 after abnormal stress test at OSH as well.  No CAD.       Hospital Course:     Ms. Bonilla Harman is an 81 yo F with PMH of Afib on eliquis, HTN, HL, PAD with critical limb ischemia who presents as today     HL  -statin     Exam  GEN: Elderly female, NAD, calm, Awake, oriented x person, place, year  HEENT: EOMI, PERRLA  Neck: Supple, no JVD  Pulm: CTAB, no crackles or wheezes  CV: RRR, no murmurs, no pulses R DP/PT  ABD: Soft, non-tender, non-distend Care: Greater than 30 minutes    Patient had opportunity to ask questions and state understand and agree with therapeutic plan as outlined      Sonia Vance MD  [facility] Hospitalist[GV.1]          Electronically signed by Kanika Caldera MD on 9/23/2017

## 2024-10-03 NOTE — CONSULTS
[facility]    PATIENT'S NAME: Mohamudkanika Madden   ATTENDING PHYSICIAN: Lolita Collins. Margarita Cervantes MD   CONSULTING PHYSICIAN: Cindy Cage.  Allison Dai MD   PATIENT ACCOUNT#:   [id number]    LOCATION:  [address] [place] [address] #:   [id number]       DATE OF BIR CAD.  Father  at 58 from a ruptured aorta, mother had a stroke at 62, both were smokers. REVIEW OF SYSTEMS:  Obtained and was negative including no history of thyroid disease.         PHYSICAL EXAMINATION:    GENERAL:  On examination, she is a plea normal S1, S2 heard

## (undated) DEVICE — DRAPE SHEET LG

## (undated) DEVICE — NON-ADHERENT STRIPS,OIL EMULSION: Brand: CURITY

## (undated) DEVICE — HEMOCLIP HORIZON SM MULTI

## (undated) DEVICE — STERILE (10.2 X 147CM) TELESCOPICALLY-FOLDED COVER: Brand: CIV-FLEX™ TRANSDUCER COVER

## (undated) DEVICE — PROXIMATE SKIN STAPLERS (35 WIDE) CONTAINS 35 STAINLESS STEEL STAPLES (FIXED HEAD): Brand: PROXIMATE

## (undated) DEVICE — HEMOCLIP MED 24 CLIP/CARTRIDGE

## (undated) DEVICE — 3M™ IOBAN™ 2 ANTIMICROBIAL INCISE DRAPE 6650EZ: Brand: IOBAN™ 2

## (undated) DEVICE — GLOVE SRG BIOGEL 8.0

## (undated) DEVICE — 3M™ TEGADERM™ TRANSPARENT FILM DRESSING, 1626W, 4 IN X 4-3/4 IN (10 CM X 12 CM), 50 EACH/CARTON, 4 CARTON/CASE: Brand: 3M™ TEGADERM™

## (undated) DEVICE — DECANTER SPIKE TRANSFLOW

## (undated) DEVICE — COTTON UNDERCAST PADDING,REGULAR FINISH: Brand: WEBRIL

## (undated) DEVICE — SUTURE VICRYL 3-0 SH

## (undated) DEVICE — SUTURE ETHILON 3-0 669H

## (undated) DEVICE — BANDAGE ROLL,100% COTTON, 6 PLY, LARGE: Brand: KERLIX

## (undated) DEVICE — SPONGE LAP 18X18 XRAY STRL

## (undated) DEVICE — STERILE POLYISOPRENE POWDER-FREE SURGICAL GLOVES: Brand: PROTEXIS

## (undated) DEVICE — NON-ADHERENT PAD PREPACK: Brand: TELFA

## (undated) DEVICE — TOWEL,OR,DSP,ST,WHITE,DLX,4/PK,20PK/CS: Brand: MEDLINE

## (undated) DEVICE — GLOVE SRG BIOGEL 8.5

## (undated) DEVICE — SUTURE PROLENE 7-0 BV-1

## (undated) DEVICE — TRAY FOLEY 16F IC URINMETER

## (undated) DEVICE — SUTURE PROLENE 6-0 C-1

## (undated) DEVICE — SOL  .9 1000ML BTL

## (undated) DEVICE — IMPERVIOUS STOCKINETTE: Brand: DEROYAL

## (undated) DEVICE — SUTURE VICRYL 2-0 CT-1

## (undated) DEVICE — SOL  .9 1000ML BAG

## (undated) DEVICE — SUTURE SILK 3-0 SH

## (undated) DEVICE — ESMARK: Brand: DEROYAL

## (undated) DEVICE — CV: Brand: MEDLINE INDUSTRIES, INC.

## (undated) DEVICE — CHLORAPREP 26ML APPLICATOR

## (undated) DEVICE — COVER SGL STRL LGHT HNDL BLU

## (undated) DEVICE — SUTURE VICRYL 5-0 J495H

## (undated) DEVICE — SUCTION CANISTER, 3000CC,SAFELINER: Brand: DEROYAL

## (undated) DEVICE — SUTURE SILK 2-0 SA85H

## (undated) DEVICE — INDICATED FOR SURGICAL CLAMPING DURING CARDIOVASCULAR PERIPHERAL VASCULAR, AND GENERAL SURGERY.: Brand: SOFT/FIBRA® SPRING CLIP

## (undated) DEVICE — 3M™ COBAN™ NL STERILE NON-LATEX SELF-ADHERENT WRAP, 2084S, 4 IN X 5 YD (10 CM X 4,5 M), 18 ROLLS/CASE: Brand: 3M™ COBAN™

## (undated) DEVICE — ZIMMER® STERILE DISPOSABLE TOURNIQUET CUFF WITH PLC, DUAL PORT, SINGLE BLADDER, 30 IN. (76 CM)

## (undated) DEVICE — GAUZE SPONGES,12 PLY: Brand: CURITY

## (undated) DEVICE — SUTURE SILK 3-0 SA64H

## (undated) DEVICE — SUTURE SILK 2-0 SA65H

## (undated) DEVICE — INTENDED TO BE USED TO OCCLUDE, RETRACT AND IDENTIFY ARTERIES, VEINS, TENDONS AND NERVES IN SURGICAL PROCEDURES: Brand: STERION®  VESSEL LOOP

## (undated) DEVICE — CONTAINER SPEC STR 4OZ GRY LID

## (undated) NOTE — LETTER
[address]  [facility] [place], [place]  [id number]  INFORMED CONSENT FOR TRANSFUSION OF BLOOD OR BLOOD PRODUCTS   My physician has informed me of the nature, purpose, benefits and risks of transfusion for blood and blood components cuco (Signature of Patient)                                                           (Responsible party in case of Minor,                                                                                                Incompetent, or unconscious Patient)  _____

## (undated) NOTE — LETTER
[facility]  Administration of Anesthesia  1. Adam Davis, or _________________________________ acting on her behalf, (Patient) (Dependent/Representative) request to receive anesthesia for my pending procedure/operation/treatment.   A infections, high spinal block, spinal bleeding, seizure, cardiac arrest and death. 7. AWARENESS: I understand that it is possible (but unlikely) to have explicit memory of events from the operating room while under general anesthesia.   8. ELECTROCONVULSIV (Date) (Time)                                                                                               (Responsible person in case of minor/ unconscious pt) /Relationship    My signature below affirms that prior to the time of the procedure, I have ex

## (undated) NOTE — LETTER
[address]  [facility] [place], [place], [place]  [address]  INFORMED CONSENT FOR TRANSFUSION OF BLOOD OR BLOOD PRODUCTS   My physician has informed me of the nature, purpose, benefits and risks of transfusion for blood and blood components cuco (Signature of Patient)                                                           (Responsible party in case of Minor,                                                                                                Incompetent, or unconscious Patient)  _____

## (undated) NOTE — LETTER
[address], [place]  Authorization for Invasive Procedures  1.  I hereby authorize Dr. Sean Dorsey, my physician and whomever may be designated as the doctor's assistant, to perform the following operation and/or procedure:  Right [place] performed for the purposes of advancing medicine, science, and/or education, provided my identity is not revealed. If the procedure has been videotaped, the physician/surgeon will obtain the original videotape.  The hospital will not be responsible for stor My signature below affirms that prior to the time of the procedure, I have explained to the patient and/or her legal representative, the risks and benefits involved in the proposed treatment and any reasonable alternative to the proposed treatment.  I have

## (undated) NOTE — LETTER
[facility], [place]  Authorization for Invasive Procedures  1.  I hereby authorize Dr. Lynne Grimm , my physician and whomever may be designated as the doctor's assistant, to perform the following operation and/or procedure:  Right pop performed for the purposes of advancing medicine, science, and/or education, provided my identity is not revealed. If the procedure has been videotaped, the physician/surgeon will obtain the original videotape.  The hospital will not be responsible for stor My signature below affirms that prior to the time of the procedure, I have explained to the patient and/or her legal representative, the risks and benefits involved in the proposed treatment and any reasonable alternative to the proposed treatment.  I have

## (undated) NOTE — LETTER
[facility]  Administration of Anesthesia  1. Solis Ly, or _________________________________ acting on her behalf, (Patient) (Dependent/Representative) request to receive anesthesia for my pending procedure/operation/treatment.   A infections, high spinal block, spinal bleeding, seizure, cardiac arrest and death. 7. AWARENESS: I understand that it is possible (but unlikely) to have explicit memory of events from the operating room while under general anesthesia.   8. ELECTROCONVULSIV (Date) (Time)                                                                                               (Responsible person in case of minor/ unconscious pt) /Relationship    My signature below affirms that prior to the time of the procedure, I have ex

## (undated) NOTE — LETTER
[facility], [place]  Authorization for Invasive Procedures  1.  I hereby authorize Dr. Ruddy Whitney, my physician and whomever may be designated as the doctor's assistant, to perform the following operation and/or procedure:  Right [facility] performed for the purposes of advancing medicine, science, and/or education, provided my identity is not revealed. If the procedure has been videotaped, the physician/surgeon will obtain the original videotape.  The hospital will not be responsible for stor My signature below affirms that prior to the time of the procedure, I have explained to the patient and/or her legal representative, the risks and benefits involved in the proposed treatment and any reasonable alternative to the proposed treatment.  I have